# Patient Record
Sex: MALE | Race: WHITE | NOT HISPANIC OR LATINO | Employment: UNEMPLOYED | URBAN - METROPOLITAN AREA
[De-identification: names, ages, dates, MRNs, and addresses within clinical notes are randomized per-mention and may not be internally consistent; named-entity substitution may affect disease eponyms.]

---

## 2022-07-04 ENCOUNTER — HOSPITAL ENCOUNTER (EMERGENCY)
Facility: CLINIC | Age: 42
Discharge: HOME | End: 2022-07-05
Attending: EMERGENCY MEDICINE | Admitting: EMERGENCY MEDICINE

## 2022-07-04 DIAGNOSIS — S05.01XA ABRASION OF RIGHT CORNEA, INITIAL ENCOUNTER: ICD-10-CM

## 2022-07-04 DIAGNOSIS — T15.91XA FOREIGN BODY OF RIGHT EYE, INITIAL ENCOUNTER: ICD-10-CM

## 2022-07-04 PROCEDURE — 99283 EMERGENCY DEPT VISIT LOW MDM: CPT

## 2022-07-04 RX ORDER — TETRACAINE HYDROCHLORIDE 5 MG/ML
1-2 SOLUTION OPHTHALMIC ONCE
Status: COMPLETED | OUTPATIENT
Start: 2022-07-05 | End: 2022-07-05

## 2022-07-05 VITALS
TEMPERATURE: 97.5 F | SYSTOLIC BLOOD PRESSURE: 140 MMHG | HEART RATE: 88 BPM | RESPIRATION RATE: 16 BRPM | WEIGHT: 205 LBS | DIASTOLIC BLOOD PRESSURE: 90 MMHG | OXYGEN SATURATION: 97 % | BODY MASS INDEX: 28.7 KG/M2 | HEIGHT: 71 IN

## 2022-07-05 PROCEDURE — 250N000009 HC RX 250: Performed by: EMERGENCY MEDICINE

## 2022-07-05 PROCEDURE — 250N000013 HC RX MED GY IP 250 OP 250 PS 637: Performed by: EMERGENCY MEDICINE

## 2022-07-05 RX ORDER — KETOROLAC TROMETHAMINE 5 MG/ML
1 SOLUTION OPHTHALMIC ONCE
Status: COMPLETED | OUTPATIENT
Start: 2022-07-05 | End: 2022-07-05

## 2022-07-05 RX ORDER — CYCLOPENTOLATE HYDROCHLORIDE 10 MG/ML
2 SOLUTION/ DROPS OPHTHALMIC ONCE
Status: COMPLETED | OUTPATIENT
Start: 2022-07-05 | End: 2022-07-05

## 2022-07-05 RX ORDER — HYDROCODONE BITARTRATE AND ACETAMINOPHEN 5; 325 MG/1; MG/1
1-2 TABLET ORAL EVERY 4 HOURS PRN
Qty: 18 TABLET | Refills: 0 | Status: SHIPPED | OUTPATIENT
Start: 2022-07-05 | End: 2022-07-08

## 2022-07-05 RX ORDER — MOXIFLOXACIN 5 MG/ML
1 SOLUTION/ DROPS OPHTHALMIC ONCE
Status: COMPLETED | OUTPATIENT
Start: 2022-07-05 | End: 2022-07-05

## 2022-07-05 RX ORDER — HYDROCODONE BITARTRATE AND ACETAMINOPHEN 5; 325 MG/1; MG/1
1 TABLET ORAL ONCE
Status: COMPLETED | OUTPATIENT
Start: 2022-07-05 | End: 2022-07-05

## 2022-07-05 RX ADMIN — HYDROCODONE BITARTRATE AND ACETAMINOPHEN 1 TABLET: 5; 325 TABLET ORAL at 00:17

## 2022-07-05 RX ADMIN — FLUORESCEIN SODIUM 1 STRIP: 1 STRIP OPHTHALMIC at 00:11

## 2022-07-05 RX ADMIN — MOXIFLOXACIN 1 DROP: 5 SOLUTION/ DROPS OPHTHALMIC at 00:44

## 2022-07-05 RX ADMIN — TETRACAINE HYDROCHLORIDE 1 DROP: 5 SOLUTION OPHTHALMIC at 00:12

## 2022-07-05 RX ADMIN — CYCLOPENTOLATE HYDROCHLORIDE 2 DROP: 10 SOLUTION/ DROPS OPHTHALMIC at 00:16

## 2022-07-05 RX ADMIN — KETOROLAC TROMETHAMINE 1 DROP: 5 SOLUTION OPHTHALMIC at 00:44

## 2022-07-05 ASSESSMENT — VISUAL ACUITY
OD: OTHER (SEE COMMENTS)
OS: 20/20

## 2022-07-05 NOTE — ED PROVIDER NOTES
EMERGENCY DEPARTMENT ENCOUnter      NAME: Aidan Prince  AGE: 41 year old male  YOB: 1980  MRN: [id number]  EVALUATION DATE & TIME: 7/4/2022 11:44 PM    PCP: No primary care provider on file.    ED PROVIDER: Whit Block MD      Chief Complaint   Patient presents with     Eye Injury         FINAL IMPRESSION:  1. Abrasion of right cornea, initial encounter    2. Foreign body of right eye, initial encounter          ED COURSE & MEDICAL DECISION MAKING:      In summary, the patient is a 41-year-old male that presents to the emergency department for evaluation of a right eye injury.  The patient is noted to have a corneal abrasion and foreign bodies on his cornea.  Foreign bodies were easily removed under slit-lamp examination.  We will treat corneal abrasion with prophylactic antibiotics and pain control.  ReCommended close outpatient follow-up with optometry or ophthalmology.    11:54 PM I met with the patient for the initial interview and physical examination. Discussed plan for treatment and workup in the ED. tetracaine was instilled into the patient's right eye.  The patient's right eye was stained with fluorescein.  Slit Lamp examination was performed  0045-Acular ophthalmic solution 1 drop was placed into the patient's right eye.  Vigamox ophthalmic solution 1 drop was placed into the patient's right eye.  Cyclogyl 1 drop was placed into the patient's right eye.  Vicodin 1 tablet p.o. was administered for pain management.    At the conclusion of the encounter I discussed the results of all of the tests and the disposition. The questions were answered. The patient or family acknowledged understanding and was agreeable with the care plan.         MEDICATIONS GIVEN IN THE EMERGENCY:  Medications   tetracaine (PONTOCAINE) 0.5 % ophthalmic solution 1-2 drop (1 drop Right Eye Given by Other Clinician 7/5/22 0012)   fluorescein (FUL-GWEN) ophthalmic strip 1 strip (1 strip Right Eye Given  "by Other Clinician 7/5/22 0011)   ketorolac (ACULAR) 0.5 % ophthalmic solution 1 drop (1 drop Right Eye Given 7/5/22 0044)   moxifloxacin (VIGAMOX) 0.5 % ophthalmic solution 1 drop (1 drop Right Eye Given 7/5/22 0044)   cyclopentolate (CYCLOGYL) 1 % ophthalmic solution 2 drop (2 drops Right Eye Given 7/5/22 0016)   HYDROcodone-acetaminophen (NORCO) 5-325 MG per tablet 1 tablet (1 tablet Oral Given 7/5/22 0017)       NEW PRESCRIPTIONS STARTED AT TODAY'S ER VISIT  Discharge Medication List as of 7/5/2022 12:47 AM      START taking these medications    Details   HYDROcodone-acetaminophen (NORCO) 5-325 MG tablet Take 1-2 tablets by mouth every 4 hours as needed for moderate to severe pain, Disp-18 tablet, R-0, Local Print                =================================================================    HPI        Aidan Prince is a 41 year old male with no contributory history who presents to this ED via private vehicle for evaluation of eye pain.    Patient reports right eye pain that he rates as a 10/10 for severity of pain and inability to see out of the right eye after an ember from his cigarette flew into the cornea of his right eye. He notes that his right eye is akin to \"looking through 2 panes of hazy glass.\" Patient states that he flushed his eye out with 2 bottles of cold water. Patient is up to date on his tetanus shot. Patient denies daily medication usage or allergies. Patient denies additional symptoms or complaints at this time.     Social History: Smoker, drinks, Currently works as a     REVIEW OF SYSTEMS     Constitutional:  Denies fever or chills  HENT:  Denies sore throat. Positive for visual changes (decreased in right eye) and eye pain (right)   Respiratory:  Denies cough or shortness of breath   Cardiovascular:  Denies chest pain or palpitations  GI:  Denies abdominal pain, nausea, or vomiting  Musculoskeletal:  Denies any new extremity pain   Skin:  Denies rash   Neurologic:  " Denies headache, focal weakness or sensory changes    All other systems reviewed and are negative      PAST MEDICAL HISTORY:  History reviewed. No pertinent past medical history.    PAST SURGICAL HISTORY:  History reviewed. No pertinent surgical history.        CURRENT MEDICATIONS:    HYDROcodone-acetaminophen (NORCO) 5-325 MG tablet        ALLERGIES:  No Known Allergies    FAMILY HISTORY:  History reviewed. No pertinent family history.    SOCIAL HISTORY:   Social History     Socioeconomic History     Marital status: Single     Spouse name: None     Number of children: None     Years of education: None     Highest education level: None       VITALS:  Patient Vitals for the past 24 hrs:   BP Pulse Resp SpO2   07/05/22 0054 (!) 140/90 88 16 97 %       PHYSICAL EXAM    Constitutional:  Well developed, Well nourished,  HENT:  Normocephalic, Atraumatic, Bilateral external ears normal, Oropharynx moist, Nose normal.   Neck:  Normal range of motion, No meningismus, No stridor.   Eyes:  EOMI, Conjunctiva injected on right, watery clear discharge.  Slit lamp reveals central corneal abrasion with superficial jason noted on cornea  Respiratory:  Normal breath sounds, No respiratory distress, No wheezing, No chest tenderness.   Cardiovascular:  Normal heart rate, Normal rhythm, No murmurs  GI:  Soft, No tenderness, No guarding, No CVA tenderness.   Musculoskeletal:  Neurovascularly intact distally, No edema, No tenderness, No cyanosis, Good range of motion in all major joints.  Integument:  Warm, Dry, No erythema, No rash.   Lymphatic:  No lymphadenopathy noted.   Neurologic:  Alert & oriented x 3, Normal motor function, No focal deficits noted.   Psychiatric:  Affect normal, Judgment normal, Mood normal.        PROCEDURE: Occular Foreign Body Removal   INDICATIONS: Foreign Body   [facility]   SITE: right eye conjunctival superficial  central   CONSENT:  The risks, benefits and alternatives for  this procedure were explained to the patient and verbally accepted.     MEDICATION: tetracine   DESCRIPTION OF PROCEDURE: The area was identified with assistance of a   Slit lamp.  After topical anesthetic took effect I proceeded to remove the foreign body with a cotton tipped applicator.         COMPLICATIONS: Patient tolerated procedure well, without complication               I, Rasta Perez, am serving as a scribe to document services personally performed by Dr. Block based on my observation and the provider's statements to me. I, Whit Block MD attest that Rasta Perez is acting in a scribe capacity, has observed my performance of the services and has documented them in accordance with my direction.    Whit Block MD  Emergency Medicine  [facility] EMERGENCY ROOM  [address] [facility] [phone]  Dept: [phone]     Whit Block MD  07/06/22 0056

## 2022-07-05 NOTE — DISCHARGE INSTRUCTIONS
Your pupil will be big for 24 hours due to medicine we placed in your right eye to help decrease pain  Ibuprofen 600 mg every 6 hours as needed for pain  Follow-up with an eye doctor in the next 1 to 2 days for recheck  Vigamox eyedrops 1 drop into your right eye every 6 hours for the next 3 days  Acular eyedrops 1 drop into your right eye every 6 hours as needed for pain  Return to the emergency department for worsening problems or concerns

## 2022-07-05 NOTE — ED TRIAGE NOTES
Pt presents with eye pain and inability to see out of right eye after an ember from his cigarette flew into his eye. Unable to visualize but patient reports blisters seen on cornea      Triage Assessment     Row Name 07/04/22 [id number]       Triage Assessment (Adult)    Airway WDL WDL       Respiratory WDL    Respiratory WDL WDL       Skin Circulation/Temperature WDL    Skin Circulation/Temperature WDL WDL       Cardiac WDL    Cardiac WDL WDL       Peripheral/Neurovascular WDL    Peripheral Neurovascular WDL WDL       Cognitive/Neuro/Behavioral WDL    Cognitive/Neuro/Behavioral WDL WDL